# Patient Record
Sex: FEMALE | Race: WHITE | Employment: FULL TIME | URBAN - METROPOLITAN AREA
[De-identification: names, ages, dates, MRNs, and addresses within clinical notes are randomized per-mention and may not be internally consistent; named-entity substitution may affect disease eponyms.]

---

## 2020-01-27 ENCOUNTER — OFFICE VISIT (OUTPATIENT)
Dept: FAMILY MEDICINE | Facility: CLINIC | Age: 50
End: 2020-01-27

## 2020-01-27 VITALS
BODY MASS INDEX: 25.16 KG/M2 | WEIGHT: 142 LBS | TEMPERATURE: 97.8 F | OXYGEN SATURATION: 98 % | DIASTOLIC BLOOD PRESSURE: 72 MMHG | HEIGHT: 63 IN | SYSTOLIC BLOOD PRESSURE: 110 MMHG | HEART RATE: 71 BPM

## 2020-01-27 DIAGNOSIS — M77.11 RIGHT TENNIS ELBOW: ICD-10-CM

## 2020-01-27 DIAGNOSIS — Z98.890 H/O SHOULDER SURGERY: ICD-10-CM

## 2020-01-27 DIAGNOSIS — M75.21 BICEPS TENDONITIS, RIGHT: Primary | ICD-10-CM

## 2020-01-27 DIAGNOSIS — S29.012A RHOMBOID MUSCLE STRAIN, INITIAL ENCOUNTER: ICD-10-CM

## 2020-01-27 PROCEDURE — 99203 OFFICE O/P NEW LOW 30 MIN: CPT | Performed by: FAMILY MEDICINE

## 2020-01-27 RX ORDER — ACETAMINOPHEN AND CODEINE PHOSPHATE 120; 12 MG/5ML; MG/5ML
0.35 SOLUTION ORAL
COMMUNITY
Start: 2019-12-03

## 2020-01-27 RX ORDER — CETIRIZINE HYDROCHLORIDE 10 MG/1
10 TABLET ORAL DAILY
COMMUNITY

## 2020-01-27 ASSESSMENT — MIFFLIN-ST. JEOR: SCORE: 1248.24

## 2020-01-27 NOTE — PROGRESS NOTES
SUBJECTIVE: 49 year old female complaining of right neck, shoulder, and arm pain for 6 month(s).   The patient describes started after working out when she had a break due to an ankle injury. Was doing weight lifting and aerobic exercises.    Going to a chiropractor for many visit and stretches without much change for 3 months.   The patient denies a history of injury but pervious humerus fracture with surgery and hardware installed.   Smoking history: No.   Relevant past medical history: positive for on oral contraceptives, numerous joint and muscle issues back and neck, depression.    OBJECTIVE: The patient appears healthy, alert, no distress, cooperative, smiling and fatigued.   EARS: negative  NOSE/SINUS: Nares normal. Septum midline. Mucosa normal. No drainage or sinus tenderness.   THROAT: normal   NECK:Neck supple. No adenopathy. Thyroid symmetric, normal size,, Carotids without bruits.   CHEST:Regular rate and  rhythm. S1 and S2 normal, no murmurs, clicks, gallops or rubs. No edema or JVD. Chest is clear; no wheezes or rales.  EXT: Shoulder exam shows a normal shoulder musculature with no atrophy.  Abduction is painless and patient can get arm over head.  Testing the rotator cuff specifically finds no special weakness of internal or external rotation.  The A-C joint is non tender and the periscapular musculature is normal.  Tender over the bicep tendon on the right and trapezius/rhomboid musculature diffusely. No deformity noted. Goof strength  Point tenderness over lateral epicondyle with positive Cozen's test. Elbow, wrist and hand otherwise normal.  CNS: Cranial nerves are normal. Fundi are normal with sharp disc margins, no papilledema, hemorrhages or exudates noted. ORALIA. EOM's intact. DTR's, motor power and sensation normal and symmetric. Babinski sign absent.  Mental status normal.  Gait and station normal.  Cerebellar function is normal.  BMI : Body mass index is 25.15 kg/m .    ASSESSMENT: (M75.21)  Biceps tendonitis, right  (primary encounter diagnosis)  Comment: anatomy reviewed  Plan: ORTHOPEDICS ADULT REFERRAL        Ice. Rehab stretches/ exercises to begin immediately and given in writing with illustrations.      (S29.012A) Rhomboid muscle strain, initial encounter  Plan: ORTHOPEDICS ADULT REFERRAL        As above    (M77.11) Right tennis elbow  Plan: ORTHOPEDICS ADULT REFERRAL        Rehab stretches/ exercises to begin immediately and given in writing with illustrations.      (Z98.890) H/O shoulder surgery  Comment: back to orthopedists for any further hardware evaluation and imaging
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
## 2020-01-27 NOTE — PATIENT INSTRUCTIONS
Biceps tendonitis, right  (primary encounter diagnosis)  Comment: anatomy reviewed  Plan: ORTHOPEDICS ADULT REFERRAL        Ice. Rehab stretches/ exercises to begin immediately and given in writing with illustrations.      (S29.012A) Rhomboid muscle strain, initial encounter  Plan: ORTHOPEDICS ADULT REFERRAL        As above    (M77.11) Right tennis elbow  Plan: ORTHOPEDICS ADULT REFERRAL        Rehab stretches/ exercises to begin immediately and given in writing with illustrations.      (Z98.890) H/O shoulder surgery  Comment: back to orthopedists for any further hardware evaluation and imaging